# Patient Record
Sex: FEMALE | Race: WHITE
[De-identification: names, ages, dates, MRNs, and addresses within clinical notes are randomized per-mention and may not be internally consistent; named-entity substitution may affect disease eponyms.]

---

## 2019-08-05 ENCOUNTER — HOSPITAL ENCOUNTER (OUTPATIENT)
Dept: HOSPITAL 92 - BICRAD | Age: 59
Discharge: HOME | End: 2019-08-05
Attending: INTERNAL MEDICINE
Payer: COMMERCIAL

## 2019-08-05 DIAGNOSIS — M19.012: ICD-10-CM

## 2019-08-05 DIAGNOSIS — Z98.890: ICD-10-CM

## 2019-08-05 DIAGNOSIS — M25.512: Primary | ICD-10-CM

## 2019-08-05 DIAGNOSIS — M25.562: ICD-10-CM

## 2019-08-05 NOTE — RAD
LEFT KNEE RADIOGRAPH SERIES 2 VIEWS:

 

INDICATION: 

Pain.

 

FINDINGS: 

There is a focal lucency involving the posterior cortex of the upper patella.  No acute fracture or d
islocation.  No joint capsular distention.

 

IMPRESSION: 

Nonspecific subcentimeter focal lucency of the posterior cortex of the superior aspect of the patella
.  This could relate to a degenerative cyst, although a lytic lesion cannot be excluded on the basis 
of this exam.  Recommend followup MRI of left knee for further assessment.

 

POS: ERNIE

## 2019-08-05 NOTE — RAD
EXAM: Left shoulder: 3 views



INDICATIONS: Shoulder pain



COMPARISON: None.



FINDINGS: Evidence of previous resection of distal left clavicle with widening of AC joint and hypert
rophic change. Humeral head is normally positioned. No acute fracture or dislocation. Mild

degenerative change at the glenohumeral joint.



IMPRESSION: Widening of AC joint.



Clinical correlation regarding prior surgical procedure at this site. Mild degenerative change.



Reported By: Mario Orlando 

Electronically Signed:  8/5/2019 3:03 PM

## 2019-08-13 ENCOUNTER — HOSPITAL ENCOUNTER (OUTPATIENT)
Dept: HOSPITAL 92 - TBSIIMAG | Age: 59
Discharge: HOME | End: 2019-08-13
Attending: NEUROLOGICAL SURGERY
Payer: COMMERCIAL

## 2019-08-13 DIAGNOSIS — M50.121: ICD-10-CM

## 2019-08-13 DIAGNOSIS — M47.12: Primary | ICD-10-CM

## 2019-08-13 DIAGNOSIS — M48.02: ICD-10-CM

## 2019-08-13 PROCEDURE — 72040 X-RAY EXAM NECK SPINE 2-3 VW: CPT

## 2019-08-13 PROCEDURE — 72141 MRI NECK SPINE W/O DYE: CPT

## 2019-08-13 NOTE — MRI
MRI Cervical spine without  contrast:



HISTORY:

Cervical spondylosis with myelopathy. Patient went to neck pain with pain radiating down left arm to 
the level of the elbow.



COMPARISON:

7/16/2015



FINDINGS:

The craniocervical junction is unremarkable.

No significant cord signal abnormality.

Paravertebral soft tissues have a normal appearance and normal signal intensity.

There is straightening of the normal cervical lordotic curvature.



C1-2:No significant stenosis.



C2-3: There is no disc bulge or disc herniation. The central spinal canal and neural foramina are pat
ent. Mild facet degenerative changes are seen.





C3-4: Again, there is loss of intervertebral disc height. A broad-based disc osteophyte complex is pr
esent with a greater right paracentral disc osteophyte complex noted which was also seen on the

prior exam. This effaces the ventral aspect of the thecal sac with slight mass effect on right kimmie
lateral aspect of the spinal cord, but there is normal signal intensity in the spinal cord at this

level. The right neural foramen is patent, but there is mild-to-moderate left-sided neural foraminal 
narrowing. Facet hypertrophic changes at this level greater on the left.



C4-5: There is a broad-based disc osteophyte complex with a central disc protrusion. The central disc
 protrusion was not evident on the prior exam. This narrows the ventral subarachnoid space with

slight effacement of the central and right lateral aspect of the spinal cord. Normal signal intensity
 is present in the spinal cord. Facet hypertrophic changes are noted on the left. The right neural

foramen is patent, but there is mild left-sided neural foraminal narrowing.



C5-6: There is loss of intervertebral disc height. There is a broad-based disc osteophyte complex aga
in noted which narrows the ventral subarachnoid space and results in mild flattening of the

anterior aspect of the spinal cord. Mild facet hypertrophic changes are present. There is mild to mod
erate bilateral neural foraminal narrowing.



C6-7: There is loss of intervertebral disc height. A broad-based disc osteophyte complex is again see
n which narrows the ventral subarachnoid space but does not result in mass effect on the spinal

cord. The right neural foramen is patent, but there is mild left-sided neural foraminal narrowing.



C7-T1: Axial images do not completely extend through this level. There is a mild broad-based disc ost
eophyte complex similar to prior exam resulting slight effacement of ventral subarachnoid space.

While neural foramina are not well assessed on the axial images, there does not appear to be signific
ant neural foraminal narrowing at this level.



IMPRESSION:

Multilevel degenerative changes seen throughout the cervical spine majority which are overall stable 
when compared to the prior exam, but there has been interval development of a right paracentral

disc protrusion at the C4-5 level which does narrow the ventral subarachnoid space and contact the sp
inal cord. Mild and mild-to-moderate degrees of neural foraminal narrowing are present. No

high-grade stenosis is seen involving the neural foramina or central canal.



Reported By: Itz Gordillo 

Electronically Signed:  8/13/2019 1:37 PM

## 2019-08-13 NOTE — RAD
CERVICAL SPINE 3 VIEWS:

 

HISTORY: M47.12, cervical spondylosis with myelopathy.

 

COMPARISON: 

MRI 7/16/2015.

 

FINDINGS: 

There is advanced degenerative disk space height loss at C5-C7 with circumferential disk-osteophyte c
omplexes.  There is degenerative 2 mm C4 or C5 retrolisthesis.  There is degenerative 1 mm C3 or C4 r
etrolisthesis.  Moderate C3-C4 degenerative disk space disease.  There is mild facet arthrosis at C4-
5 with 2 mm anterolisthesis which decreases with extension and increases with flexion. 

 

The C3-4 retrolisthesis decreases with flexion and increases with extension.

 

IMPRESSION: 

Abnormal translation at C3-4 and C4-5.

 

POS: CET

## 2019-08-29 ENCOUNTER — HOSPITAL ENCOUNTER (OUTPATIENT)
Dept: HOSPITAL 92 - TBSIIMAG | Age: 59
Discharge: HOME | End: 2019-08-29
Attending: ANESTHESIOLOGY
Payer: COMMERCIAL

## 2019-08-29 DIAGNOSIS — R22.42: ICD-10-CM

## 2019-08-29 DIAGNOSIS — M22.8X2: ICD-10-CM

## 2019-08-29 DIAGNOSIS — M25.561: Primary | ICD-10-CM

## 2019-08-29 DIAGNOSIS — S83.272A: ICD-10-CM

## 2019-08-29 NOTE — RAD
RIGHT KNEE 4 VIEWS:



HISTORY: Right anterior knee pain



FINDINGS:

Mild degenerative changes are present. No acute fracture, dislocation or bony destruction is seen. No
 joint effusion is identified.



Reported By: Gideon Licona 

Electronically Signed:  8/29/2019 9:06 AM

## 2019-08-29 NOTE — MRI
MRI OF THE LEFT KNEE:

 

DATE: 8/29/2019.

 

PROVIDED CLINICAL HISTORY: 

Knee pain, abnormal x-ray.

 

FINDINGS: 

Comparison is made with the radiographs dated 8/5/2019.

 

The anterior cruciate ligament, posterior cruciate ligament, mediocollateral ligaments, and lateral c
ollateral ligamentous complex demonstrate an intact MR appearance, as does the extensor mechanism.  

 

There is complex degenerative tearing involving the body of the lateral meniscus without evidence for
 meniscal displacement.  There is extension of tear in a horizontal manner to involve the posterior h
orn of the lateral meniscus.  The medial meniscus demonstrates no evidence for tear.  

 

There is articular cartilage irregularity involving the median ridge of the patella with subcortical 
cyst-like change, accounting for the radiographic finding.  No lytic lesion is evident.  No additiona
l focal articular cartilage loss is evident.

 

There is a physiologic amount of fluid within the knee joint.  There is a small ganglion cyst present
 at the posterior aspect of the knee joint laterally adjacent to the lateral head of the gastrocnemiu
s origin.  There is a small Baker's cyst.  

 

IMPRESSION: 

1.  Lateral meniscal tear as described.

 

2.  Patellar articular chondrosis, producing the described radiographic finding.

 

POS: OFF